# Patient Record
Sex: MALE | Race: WHITE | NOT HISPANIC OR LATINO | Employment: OTHER | URBAN - METROPOLITAN AREA
[De-identification: names, ages, dates, MRNs, and addresses within clinical notes are randomized per-mention and may not be internally consistent; named-entity substitution may affect disease eponyms.]

---

## 2024-01-10 ENCOUNTER — OFFICE VISIT (OUTPATIENT)
Dept: SLEEP MEDICINE | Facility: CLINIC | Age: 69
End: 2024-01-10
Payer: MEDICARE

## 2024-01-10 VITALS
SYSTOLIC BLOOD PRESSURE: 168 MMHG | HEIGHT: 68 IN | OXYGEN SATURATION: 93 % | BODY MASS INDEX: 33.65 KG/M2 | WEIGHT: 222 LBS | DIASTOLIC BLOOD PRESSURE: 80 MMHG | HEART RATE: 116 BPM

## 2024-01-10 DIAGNOSIS — I10 HYPERTENSION, UNSPECIFIED TYPE: Primary | ICD-10-CM

## 2024-01-10 DIAGNOSIS — G47.10 HYPERSOMNOLENCE: ICD-10-CM

## 2024-01-10 DIAGNOSIS — G47.33 OSA (OBSTRUCTIVE SLEEP APNEA): ICD-10-CM

## 2024-01-10 PROCEDURE — 3079F DIAST BP 80-89 MM HG: CPT | Performed by: STUDENT IN AN ORGANIZED HEALTH CARE EDUCATION/TRAINING PROGRAM

## 2024-01-10 PROCEDURE — 1126F AMNT PAIN NOTED NONE PRSNT: CPT | Performed by: STUDENT IN AN ORGANIZED HEALTH CARE EDUCATION/TRAINING PROGRAM

## 2024-01-10 PROCEDURE — 3008F BODY MASS INDEX DOCD: CPT | Performed by: STUDENT IN AN ORGANIZED HEALTH CARE EDUCATION/TRAINING PROGRAM

## 2024-01-10 PROCEDURE — 1159F MED LIST DOCD IN RCRD: CPT | Performed by: STUDENT IN AN ORGANIZED HEALTH CARE EDUCATION/TRAINING PROGRAM

## 2024-01-10 PROCEDURE — 99214 OFFICE O/P EST MOD 30 MIN: CPT | Performed by: STUDENT IN AN ORGANIZED HEALTH CARE EDUCATION/TRAINING PROGRAM

## 2024-01-10 PROCEDURE — 1036F TOBACCO NON-USER: CPT | Performed by: STUDENT IN AN ORGANIZED HEALTH CARE EDUCATION/TRAINING PROGRAM

## 2024-01-10 PROCEDURE — 3077F SYST BP >= 140 MM HG: CPT | Performed by: STUDENT IN AN ORGANIZED HEALTH CARE EDUCATION/TRAINING PROGRAM

## 2024-01-10 RX ORDER — DEXTROAMPHETAMINE SACCHARATE, AMPHETAMINE ASPARTATE, DEXTROAMPHETAMINE SULFATE AND AMPHETAMINE SULFATE 5; 5; 5; 5 MG/1; MG/1; MG/1; MG/1
20 TABLET ORAL 2 TIMES DAILY
COMMUNITY
Start: 2023-12-18 | End: 2024-01-21

## 2024-01-10 RX ORDER — LIDOCAINE AND PRILOCAINE 25; 25 MG/G; MG/G
CREAM TOPICAL
COMMUNITY
Start: 2023-07-06 | End: 2024-07-04

## 2024-01-10 RX ORDER — SILDENAFIL CITRATE 20 MG/1
20-50 TABLET ORAL
COMMUNITY
Start: 2023-11-06

## 2024-01-10 RX ORDER — MODAFINIL 200 MG/1
1 TABLET ORAL
COMMUNITY
Start: 2023-12-18 | End: 2024-01-21

## 2024-01-10 RX ORDER — TESTOSTERONE CYPIONATE 200 MG/ML
INJECTION, SOLUTION INTRAMUSCULAR
COMMUNITY
Start: 2023-08-30 | End: 2024-01-27

## 2024-01-10 RX ORDER — TAMSULOSIN HYDROCHLORIDE 0.4 MG/1
0.4 CAPSULE ORAL
COMMUNITY
Start: 2020-11-18 | End: 2024-07-05

## 2024-01-10 RX ORDER — DONEPEZIL HYDROCHLORIDE 5 MG/1
TABLET, FILM COATED ORAL
COMMUNITY
Start: 2020-11-18

## 2024-01-10 RX ORDER — AZELASTINE 1 MG/ML
1 SPRAY, METERED NASAL 2 TIMES DAILY
COMMUNITY
Start: 2023-07-24

## 2024-01-10 RX ORDER — SYRINGE W-NEEDLE,DISPOSAB,3 ML 25GX5/8"
SYRINGE, EMPTY DISPOSABLE MISCELLANEOUS
COMMUNITY
Start: 2023-03-14

## 2024-01-10 ASSESSMENT — SLEEP AND FATIGUE QUESTIONNAIRES
HOW LIKELY ARE YOU TO NOD OFF OR FALL ASLEEP WHEN YOU ARE A PASSENGER IN A CAR FOR AN HOUR WITHOUT A BREAK: WOULD NEVER DOZE
WAKING_TOO_EARLY: MODERATE
DIFFICULTY_STAYING_ASLEEP: MODERATE
HOW LIKELY ARE YOU TO NOD OFF OR FALL ASLEEP WHILE WATCHING TV: HIGH CHANCE OF DOZING
ESS-CHAD TOTAL SCORE: 9
WORRIED_DISTRESSED_DUE_TO_SLEEP: A LITTLE
HOW LIKELY ARE YOU TO NOD OFF OR FALL ASLEEP WHILE LYING DOWN TO REST IN THE AFTERNOON WHEN CIRCUMSTANCES PERMIT: HIGH CHANCE OF DOZING
HOW LIKELY ARE YOU TO NOD OFF OR FALL ASLEEP WHILE SITTING AND TALKING TO SOMEONE: WOULD NEVER DOZE
SATISFACTION_WITH_CURRENT_SLEEP_PATTERN: VERY DISSATISFIED
SLEEP_PROBLEM_NOTICEABLE_TO_OTHERS: A LITTLE
HOW LIKELY ARE YOU TO NOD OFF OR FALL ASLEEP IN A CAR, WHILE STOPPED FOR A FEW MINUTES IN TRAFFIC: SLIGHT CHANCE OF DOZING
HOW LIKELY ARE YOU TO NOD OFF OR FALL ASLEEP WHILE SITTING QUIETLY AFTER LUNCH WITHOUT ALCOHOL: WOULD NEVER DOZE
SLEEP_PROBLEM_INTERFERES_DAILY_ACTIVITIES: A LITTLE
SITING INACTIVE IN A PUBLIC PLACE LIKE A CLASS ROOM OR A MOVIE THEATER: WOULD NEVER DOZE
HOW LIKELY ARE YOU TO NOD OFF OR FALL ASLEEP WHILE SITTING AND READING: MODERATE CHANCE OF DOZING

## 2024-01-10 ASSESSMENT — LIFESTYLE VARIABLES
HOW MANY STANDARD DRINKS CONTAINING ALCOHOL DO YOU HAVE ON A TYPICAL DAY: PATIENT DOES NOT DRINK
AUDIT-C TOTAL SCORE: 0
HOW OFTEN DO YOU HAVE A DRINK CONTAINING ALCOHOL: NEVER
HOW OFTEN DO YOU HAVE SIX OR MORE DRINKS ON ONE OCCASION: NEVER
SKIP TO QUESTIONS 9-10: 1

## 2024-01-10 ASSESSMENT — PATIENT HEALTH QUESTIONNAIRE - PHQ9
1. LITTLE INTEREST OR PLEASURE IN DOING THINGS: NOT AT ALL
SUM OF ALL RESPONSES TO PHQ9 QUESTIONS 1 AND 2: 0
2. FEELING DOWN, DEPRESSED OR HOPELESS: NOT AT ALL

## 2024-01-10 ASSESSMENT — ENCOUNTER SYMPTOMS
NEUROLOGICAL NEGATIVE: 1
RESPIRATORY NEGATIVE: 1
PSYCHIATRIC NEGATIVE: 1
CARDIOVASCULAR NEGATIVE: 1
CONSTITUTIONAL NEGATIVE: 1

## 2024-01-10 NOTE — PROGRESS NOTES
" Patient: Kenroy Julien    [id number]  : 1955 -- AGE 68 y.o.    Provider: Kevin Greenwood MD     Location [facility]   Service Date: 1/10/2024              [facility]  Followup Visit Note    HISTORY OF PRESENT ILLNESS     HISTORY OF PRESENT ILLNESS   Kenroy Julien is a 68 y.o. male with h/o Hypertension, RAMESH, and Obesity who presents to a [facility] for followup.     Assessment and plan from last visit:     Sleep Apnea, Suspected / IDIOPATHIC HYPERSOMNIA VS. NARCOLEPSY/irregular sleep (Circadian) cycle  He could have sleep apnea, but given his symptoms, he could also have underlying hypersomnia of central origin. His overall clinical picture is complicated by prior \"brain injury\" and years of use of stimulants without a formal diagnosis or testing of hypersomnia.  - Will order an overnight sleep study, followed by MSLT the next day. If AHI > 20, we will treat RAMESH first, and cancel MSLT. If sleep logs not returned, we will cancel MSLT. Patient voiced understanding.  - Perform urine toxicology prior to sleep study.  - Will call patient within 3 weeks after the test. Will discuss follow up plan at that time.  - advise to Consolidate night time sleep.     HYPERTENSION/ ATRIAL FIBRILLATION/ CAD/ CHF:  -Blood pressure today: 151/87  -Denies CP, SOB, unusual HA, vision changes, dizziness or other symptoms   -continue PCP f/u      OBESITY with a BMI of 35  -healthy eating/exercise discussed      -Advised avoid driving or operating machinery if drowsy  Will call patient with results of PSG/MSLT    Chart Update from Vani Myles PA-C :     I have reviewed results of patient's sleep study, as follows:   AHI3% 31.5, AHI 4% 14.9; O2 markel 85%     I have called Kenroy Julien to discuss these results. He is agreeable to starting PAP therapy. AutoCPAP 5-15cwp has been ordered through [facility].      DIscussed acclimating to PAP therapy, he " "would like to try a nasal type of mask, which has been indicated on the PAP order.   He is aware to follow up with Dr. Greenwood or myself in 31-90 days after PAP set up and will call if he is having any trouble with PAP use or has any questions.     Current History    Is using a nasal mask, which is comfortable for him. Gets 4-5 hours with CPAP each night.     Has a variable bedtime, and at times goes to bed at 3AM. Thinks he is sleeping 4 hours a night.     Is taking modafinil 200 1x in the morning, adderall 1/2 tablet in the morning, 1 tablet at noon, 1/2 tablet in the afternoon. Says wearing CPAP has helped with \"wavy\" feeling throughout the day but does still endorses sleep inertia.    CPAP usage has been low in the past few days due to oral infection that has been going on the past month.     Daytime Symptoms    Patient reports: sleep inertia    Naps: naps daily in response to cognitive heavy tasks and eating, nap lasts several hours   Fatigue: does not occur with physical tasks but with mental tasks     ESS: 9  FARIDEH: 11  FOSQ: 35    REVIEW OF SYSTEMS     REVIEW OF SYSTEMS  Review of Systems   Constitutional: Negative.    HENT: Negative.     Respiratory: Negative.     Cardiovascular: Negative.    Genitourinary: Negative.    Skin: Negative.    Neurological: Negative.    Psychiatric/Behavioral: Negative.           ALLERGIES AND MEDICATIONS     ALLERGIES  No Known Allergies    MEDICATIONS: He has a current medication list which includes the following prescription(s): amphetamine-dextroamphetamine - Take 1 tablet (20 mg) by mouth twice a day, azelastine - Administer 1 spray into affected nostril(s) twice a day, donepezil - Take by mouth, lidocaine-prilocaine - USE EVERY 2 WEEKS PRIOR TO TESTOSTERONE INJECTION, modafinil - Take 1 tablet (200 mg) by mouth once daily in the morning. Take before meals, sildenafil - Take 1-2.5 tablets (20-50 mg) by mouth, syringe with needle - Use 2 syringes for each injection every 12 days, " "tamsulosin - Take 1 capsule (0.4 mg) by mouth once daily, and testosterone cypionate - Inject 0.6ml every 12 days.    PAST MEDICAL HISTORY : He  has no past medical history on file.    PAST SURGICAL HISTORY: He  has no past surgical history on file.     FAMILY HISTORY: No changes since previous visit. Otherwise non-contributory as charted.     SOCIAL HISTORY  He  reports that he has quit smoking. His smoking use included cigarettes. He quit smokeless tobacco use about 46 years ago. He reports that he does not currently use alcohol. He reports that he does not currently use drugs.       PHYSICAL EXAM     VITAL SIGNS: , /80, SP02 93%, BMI 33.75    PREVIOUS WEIGHTS:  Wt Readings from Last 3 Encounters:   01/10/24 101 kg (222 lb)   01/06/22 103 kg (226 lb 8 oz)   11/18/20 104 kg (229 lb 8 oz)     General: alert, oriented to conversation. Well-appearing, obese male   Head: normocephalic, atraumatic   Nose: nasal congestion  Mouth: mallampati class 2, moist oral mucosa   Cardio: RRR, no murmurs   Resp: normal respiratory effort, normal breath sounds       RESULTS/DATA     No results found for: \"CO2\", \"IRON\", \"TRANSFERRIN\", \"IRONSAT\", \"TIBC\", \"FERRITIN\"    PAP Adherence  A PAP adherence download was obtained and data was reviewed personally today in clinic.    ASSESSMENT/PLAN     Mr. Julien is a 68 y.o. male and he returns in followup to the [facility] for RAMESH.  Problem List, Orders, Assessment, Recommendations:      #RAMESH  -sleep study 3/2022: AHI3% 31.5, AHI 4% 14.9; O2 markel 85%  -auto CPAP 5-15  -usage >4, 63%, AHI 3.0  -plan to keep same plan  -encouraged patient to wear CPAP when he takes his daily naps to increase his usage    #HTN  -BP today 168/80, not taking it at home  -was asymptomatic in office today  -discussed importance to follow up with PCP at [facility] regarding BP    #Obesity  -current BMI 33.75  -is motivated to lose weight, has been exercising lately in his " home  -celebrated his weight loss since last visit!     Disposition    Return to clinic in 8-10 months    Maryam Cross MS4         I was present with the Medical Student who participated in the documentation of this note. I have personally seen and re-examined the patient and performed the medical decision-making components (assessment and plan of care). I have reviewed the Medical Student documentation and verified the findings in the note as written with additions or exceptions as stated in the body of this note.    Kevin Greenwood MD

## 2024-01-11 ENCOUNTER — APPOINTMENT (OUTPATIENT)
Dept: SLEEP MEDICINE | Facility: HOSPITAL | Age: 69
End: 2024-01-11
Payer: MEDICARE

## 2024-10-23 ENCOUNTER — APPOINTMENT (OUTPATIENT)
Dept: SLEEP MEDICINE | Facility: CLINIC | Age: 69
End: 2024-10-23
Payer: MEDICARE

## 2024-10-23 VITALS
DIASTOLIC BLOOD PRESSURE: 77 MMHG | SYSTOLIC BLOOD PRESSURE: 156 MMHG | WEIGHT: 222 LBS | HEART RATE: 90 BPM | BODY MASS INDEX: 33.75 KG/M2 | OXYGEN SATURATION: 94 %

## 2024-10-23 DIAGNOSIS — I10 HYPERTENSION, UNSPECIFIED TYPE: ICD-10-CM

## 2024-10-23 DIAGNOSIS — G47.23 IRREGULAR SLEEP-WAKE RHYTHM: ICD-10-CM

## 2024-10-23 DIAGNOSIS — G47.10 HYPERSOMNOLENCE: ICD-10-CM

## 2024-10-23 DIAGNOSIS — F51.12 INSUFFICIENT SLEEP SYNDROME: ICD-10-CM

## 2024-10-23 DIAGNOSIS — G47.33 OSA (OBSTRUCTIVE SLEEP APNEA): Primary | ICD-10-CM

## 2024-10-23 PROCEDURE — 1160F RVW MEDS BY RX/DR IN RCRD: CPT | Performed by: STUDENT IN AN ORGANIZED HEALTH CARE EDUCATION/TRAINING PROGRAM

## 2024-10-23 PROCEDURE — 3077F SYST BP >= 140 MM HG: CPT | Performed by: STUDENT IN AN ORGANIZED HEALTH CARE EDUCATION/TRAINING PROGRAM

## 2024-10-23 PROCEDURE — 1159F MED LIST DOCD IN RCRD: CPT | Performed by: STUDENT IN AN ORGANIZED HEALTH CARE EDUCATION/TRAINING PROGRAM

## 2024-10-23 PROCEDURE — 1036F TOBACCO NON-USER: CPT | Performed by: STUDENT IN AN ORGANIZED HEALTH CARE EDUCATION/TRAINING PROGRAM

## 2024-10-23 PROCEDURE — G2211 COMPLEX E/M VISIT ADD ON: HCPCS | Performed by: STUDENT IN AN ORGANIZED HEALTH CARE EDUCATION/TRAINING PROGRAM

## 2024-10-23 PROCEDURE — 3078F DIAST BP <80 MM HG: CPT | Performed by: STUDENT IN AN ORGANIZED HEALTH CARE EDUCATION/TRAINING PROGRAM

## 2024-10-23 PROCEDURE — 99214 OFFICE O/P EST MOD 30 MIN: CPT | Performed by: STUDENT IN AN ORGANIZED HEALTH CARE EDUCATION/TRAINING PROGRAM

## 2024-10-23 RX ORDER — METOPROLOL SUCCINATE 25 MG/1
25 TABLET, EXTENDED RELEASE ORAL
COMMUNITY
Start: 2024-09-19

## 2024-10-23 ASSESSMENT — ENCOUNTER SYMPTOMS
CONSTITUTIONAL NEGATIVE: 1
RESPIRATORY NEGATIVE: 1
NEUROLOGICAL NEGATIVE: 1
PSYCHIATRIC NEGATIVE: 1
CARDIOVASCULAR NEGATIVE: 1

## 2024-10-23 NOTE — ASSESSMENT & PLAN NOTE
Lack of routine is the main contributor  Encouraged to document sleep time and prevent himself from falling asleep without his CPAP or outside of his bed

## 2024-10-23 NOTE — ASSESSMENT & PLAN NOTE
Currently on Adderall and Provigil prescribed by PCP  Inadequate treatment of RAMESH and insufficient sleep may be contributing a lot to patient's symptoms

## 2024-10-23 NOTE — PROGRESS NOTES
Patient: Kenroy Julien    [id number]  : 1955 -- AGE 69 y.o.    Provider: Kevin Greenwood MD     [place] [facility] [place] Date: 10/23/2024              [facility]  Followup Visit Note    ASSESSMENT/PLAN     Mr. Julien is a 69 y.o. male and he returns in followup to the [facility] for the problems listed below on 10/23/24     Problem List, Orders, Assessment, Recommendations:  Problem List Items Addressed This Visit             ICD-10-CM    RAMESH (obstructive sleep apnea) - Primary G47.33     Encouraged usage extension  Renew supply orders         Relevant Orders    Positive Airway Pressure (PAP) Therapy    HTN (hypertension) I10     BP Readings from Last 1 Encounters:   10/23/24 156/77     - BP elevated today, denies headache, denies change of vision.  - discussed at length the impact of untreated RAMESH and BP control  - continue current management and follow-up with PCP           Hypersomnolence G47.10     Currently on Adderall and Provigil prescribed by PCP  Inadequate treatment of RAMESH and insufficient sleep may be contributing a lot to patient's symptoms         BMI 33.0-33.9,adult Z68.33     BMI Readings from Last 1 Encounters:   10/23/24 33.75 kg/m²     - encouraged healthy weight loss via diet and exercise           Insufficient sleep syndrome F51.12     Lack of routine is the main contributor  Encouraged to document sleep time and prevent himself from falling asleep without his CPAP or outside of his bed    This may be a big reason why he needs stimulants.  Will try to communicate with PCP about this         Irregular sleep-wake rhythm G47.23     Lack of routine is the main contributor  Encouraged to document sleep time and prevent himself from falling asleep without his CPAP or outside of his bed            Disposition    Return to clinic in 8 months         HISTORY OF PRESENT ILLNESS     HISTORY OF PRESENT ILLNESS    Kenroy Julien is a 69 y.o. male with h/o RAMESH who presents to a [facility] for followup.     Assessment and plan from last visit: 1/10/2024    Mr. Julien is a 68 y.o. male and he returns in followup to the [facility] for RAMESH.  Problem List, Orders, Assessment, Recommendations:        #RAMESH  -sleep study 3/2022: AHI3% 31.5, AHI 4% 14.9; O2 markel 85%  -auto CPAP 5-15  -usage >4, 63%, AHI 3.0  -plan to keep same plan  -encouraged patient to wear CPAP when he takes his daily naps to increase his usage     #HTN  -BP today 168/80, not taking it at home  -was asymptomatic in office today  -discussed importance to follow up with PCP at [facility] regarding BP     #Obesity  -current BMI 33.75  -is motivated to lose weight, has been exercising lately in his home  -celebrated his weight loss since last visit!      Disposition     Return to clinic in 8-10 months    Current History    On today's visit, the patient reports that he has been doing ok.  He is still the same as before, not sure when he sleeps and not sure when he wakes up.  We spent a lot of time today listening to his story of how he was discharged from various specialties including neurology and psychiatry.  He has not used his CPAP consistently and admits to have a rather erratic schedule.  He also lacks social support.  He reports that his primary care doctor has been managing his stimulants (Adderall and Modafinil).    PAP Info  DURABLE MEDICAL EQUIPMENT COMPANY: [employer]  Issues with therapy: often doesn't use it long enough  Benefits with PAP: PERCEIVED BENEFITS OF PAP: refreshing sleep    PAP Adherence  A PAP adherence download was obtained and data was reviewed personally today in clinic.    RLS Followup:   none.    Daytime Symptoms    Patient reports DAYTIME SYMPTOMS: excessively sleepy during the day    Naps: frequent dozing  Fatigue: symptoms bothersome, but easily able to  "carry out all usual work/school/family activities    REVIEW OF SYSTEMS     REVIEW OF SYSTEMS  Review of Systems   Constitutional: Negative.    HENT: Negative.     Respiratory: Negative.     Cardiovascular: Negative.    Genitourinary: Negative.    Skin: Negative.    Neurological: Negative.    Psychiatric/Behavioral: Negative.           ALLERGIES AND MEDICATIONS     ALLERGIES  No Known Allergies    MEDICATIONS: He has a current medication list which includes the following prescription(s): metoprolol succinate xl - Take 1 tablet (25 mg) by mouth once daily, rivaroxaban, sildenafil - Take 1-2.5 tablets (20-50 mg) by mouth, syringe with needle - Use 2 syringes for each injection every 12 days, amphetamine-dextroamphetamine - Take 1 tablet (20 mg) by mouth twice a day, modafinil - Take 1 tablet (200 mg) by mouth once daily in the morning. Take before meals, and testosterone cypionate - Inject 0.6ml every 12 days.    PAST MEDICAL HISTORY : He  has no past medical history on file.    PAST SURGICAL HISTORY: He  has no past surgical history on file.     FAMILY HISTORY: No changes since previous visit. Otherwise non-contributory as charted.     SOCIAL HISTORY  He  reports that he has quit smoking. His smoking use included cigarettes. He quit smokeless tobacco use about 46 years ago. He reports that he does not currently use alcohol. He reports that he does not currently use drugs.       PHYSICAL EXAM     VITAL SIGNS: /77   Pulse 90   Wt 101 kg (222 lb)   SpO2 94%   BMI 33.75 kg/m²      PREVIOUS WEIGHTS:  Wt Readings from Last 3 Encounters:   10/23/24 101 kg (222 lb)   01/10/24 101 kg (222 lb)   01/06/22 103 kg (226 lb 8 oz)         RESULTS/DATA     No results found for: \"CO2\", \"IRON\", \"TRANSFERRIN\", \"IRONSAT\", \"TIBC\", \"FERRITIN\"            "

## 2024-10-23 NOTE — ASSESSMENT & PLAN NOTE
Lack of routine is the main contributor  Encouraged to document sleep time and prevent himself from falling asleep without his CPAP or outside of his bed    This may be a big reason why he needs stimulants.  Will try to communicate with PCP about this

## 2024-10-23 NOTE — ASSESSMENT & PLAN NOTE
BMI Readings from Last 1 Encounters:   10/23/24 33.75 kg/m²     - encouraged healthy weight loss via diet and exercise

## 2024-10-23 NOTE — LETTER
2024     Carmen Coulter MD  [address] [place] [address]    Patient: Kenroy Julien   YOB: 1955   Date of Visit: 10/23/2024       Dear Dr. Carmen Coulter MD:    Thank you for referring Kenroy Julien to me for evaluation. Below are my notes for this consultation.  If you have questions, please do not hesitate to call me. I look forward to following your patient along with you.    I do think that he might really benefit from a full neuro-psych evaluation.  Without change of behavior and potentially introduction of daily routine, I think stimulants are just masking his symptoms.       Sincerely,     Kevin Greenwood MD      CC: No Recipients  ______________________________________________________________________________________     Patient: Kenroy Julien    [id number]  : 1955 -- AGE 69 y.o.    Provider: eKvin Greenwood MD     Location [facility]   Service Date: 10/23/2024              [facility]  Followup Visit Note    ASSESSMENT/PLAN     Mr. Julien is a 69 y.o. male and he returns in followup to the [facility] for the problems listed below on 10/23/24     Problem List, Orders, Assessment, Recommendations:  Problem List Items Addressed This Visit             ICD-10-CM    RAMESH (obstructive sleep apnea) - Primary G47.33     Encouraged usage extension  Renew supply orders         Relevant Orders    Positive Airway Pressure (PAP) Therapy    HTN (hypertension) I10     BP Readings from Last 1 Encounters:   10/23/24 156/77     - BP elevated today, denies headache, denies change of vision.  - discussed at length the impact of untreated RAMESH and BP control  - continue current management and follow-up with PCP           Hypersomnolence G47.10     Currently on Adderall and Provigil prescribed by PCP  Inadequate treatment of RAMESH and insufficient sleep may be contributing a lot to patient's symptoms          BMI 33.0-33.9,adult Z68.33     BMI Readings from Last 1 Encounters:   10/23/24 33.75 kg/m²     - encouraged healthy weight loss via diet and exercise           Insufficient sleep syndrome F51.12     Lack of routine is the main contributor  Encouraged to document sleep time and prevent himself from falling asleep without his CPAP or outside of his bed    This may be a big reason why he needs stimulants.  Will try to communicate with PCP about this         Irregular sleep-wake rhythm G47.23     Lack of routine is the main contributor  Encouraged to document sleep time and prevent himself from falling asleep without his CPAP or outside of his bed            Disposition    Return to clinic in 8 months         HISTORY OF PRESENT ILLNESS     HISTORY OF PRESENT ILLNESS   Kenroy Julien is a 69 y.o. male with h/o RAMESH who presents to a [facility] for followup.     Assessment and plan from last visit: 1/10/2024    Mr. Julien is a 68 y.o. male and he returns in followup to the [facility] for RAMESH.  Problem List, Orders, Assessment, Recommendations:        #RAMESH  -sleep study 3/2022: AHI3% 31.5, AHI 4% 14.9; O2 markel 85%  -auto CPAP 5-15  -usage >4, 63%, AHI 3.0  -plan to keep same plan  -encouraged patient to wear CPAP when he takes his daily naps to increase his usage     #HTN  -BP today 168/80, not taking it at home  -was asymptomatic in office today  -discussed importance to follow up with PCP at [facility] regarding BP     #Obesity  -current BMI 33.75  -is motivated to lose weight, has been exercising lately in his home  -celebrated his weight loss since last visit!      Disposition     Return to clinic in 8-10 months    Current History    On today's visit, the patient reports that he has been doing ok.  He is still the same as before, not sure when he sleeps and not sure when he wakes up.  We spent a lot of time today listening to his story of how he was  discharged from various specialties including neurology and psychiatry.  He has not used his CPAP consistently and admits to have a rather erratic schedule.  He also lacks social support.  He reports that his primary care doctor has been managing his stimulants (Adderall and Modafinil).    PAP Info  DURABLE MEDICAL EQUIPMENT COMPANY: [employer]  Issues with therapy: often doesn't use it long enough  Benefits with PAP: PERCEIVED BENEFITS OF PAP: refreshing sleep    PAP Adherence  A PAP adherence download was obtained and data was reviewed personally today in clinic.    RLS Followup:   none.    Daytime Symptoms    Patient reports DAYTIME SYMPTOMS: excessively sleepy during the day    Naps: frequent dozing  Fatigue: symptoms bothersome, but easily able to carry out all usual work/school/family activities    REVIEW OF SYSTEMS     REVIEW OF SYSTEMS  Review of Systems   Constitutional: Negative.    HENT: Negative.     Respiratory: Negative.     Cardiovascular: Negative.    Genitourinary: Negative.    Skin: Negative.    Neurological: Negative.    Psychiatric/Behavioral: Negative.           ALLERGIES AND MEDICATIONS     ALLERGIES  No Known Allergies    MEDICATIONS: He has a current medication list which includes the following prescription(s): metoprolol succinate xl - Take 1 tablet (25 mg) by mouth once daily, rivaroxaban, sildenafil - Take 1-2.5 tablets (20-50 mg) by mouth, syringe with needle - Use 2 syringes for each injection every 12 days, amphetamine-dextroamphetamine - Take 1 tablet (20 mg) by mouth twice a day, modafinil - Take 1 tablet (200 mg) by mouth once daily in the morning. Take before meals, and testosterone cypionate - Inject 0.6ml every 12 days.    PAST MEDICAL HISTORY : He  has no past medical history on file.    PAST SURGICAL HISTORY: He  has no past surgical history on file.     FAMILY HISTORY: No changes since previous visit. Otherwise non-contributory as charted.     SOCIAL HISTORY  He   "reports that he has quit smoking. His smoking use included cigarettes. He quit smokeless tobacco use about 46 years ago. He reports that he does not currently use alcohol. He reports that he does not currently use drugs.       PHYSICAL EXAM     VITAL SIGNS: /77   Pulse 90   Wt 101 kg (222 lb)   SpO2 94%   BMI 33.75 kg/m²      PREVIOUS WEIGHTS:  Wt Readings from Last 3 Encounters:   10/23/24 101 kg (222 lb)   01/10/24 101 kg (222 lb)   01/06/22 103 kg (226 lb 8 oz)         RESULTS/DATA     No results found for: \"CO2\", \"IRON\", \"TRANSFERRIN\", \"IRONSAT\", \"TIBC\", \"FERRITIN\"            "

## 2024-10-23 NOTE — ASSESSMENT & PLAN NOTE
BP Readings from Last 1 Encounters:   10/23/24 156/77     - BP elevated today, denies headache, denies change of vision.  - discussed at length the impact of untreated RAMESH and BP control  - continue current management and follow-up with PCP

## 2025-06-25 ENCOUNTER — APPOINTMENT (OUTPATIENT)
Dept: SLEEP MEDICINE | Facility: CLINIC | Age: 70
End: 2025-06-25
Payer: MEDICARE

## 2025-07-10 ENCOUNTER — APPOINTMENT (OUTPATIENT)
Dept: SLEEP MEDICINE | Facility: HOSPITAL | Age: 70
End: 2025-07-10
Payer: MEDICARE